# Patient Record
Sex: MALE | Race: BLACK OR AFRICAN AMERICAN | Employment: OTHER | URBAN - METROPOLITAN AREA
[De-identification: names, ages, dates, MRNs, and addresses within clinical notes are randomized per-mention and may not be internally consistent; named-entity substitution may affect disease eponyms.]

---

## 2017-03-09 ENCOUNTER — OFFICE VISIT (OUTPATIENT)
Dept: ENDOCRINOLOGY CLINIC | Facility: CLINIC | Age: 67
End: 2017-03-09

## 2017-03-09 VITALS
HEIGHT: 69.5 IN | HEART RATE: 67 BPM | RESPIRATION RATE: 14 BRPM | DIASTOLIC BLOOD PRESSURE: 89 MMHG | SYSTOLIC BLOOD PRESSURE: 174 MMHG | TEMPERATURE: 98 F | WEIGHT: 225 LBS | BODY MASS INDEX: 32.58 KG/M2

## 2017-03-09 DIAGNOSIS — Z79.4 TYPE 2 DIABETES MELLITUS WITHOUT COMPLICATION, WITH LONG-TERM CURRENT USE OF INSULIN (HCC): Primary | ICD-10-CM

## 2017-03-09 DIAGNOSIS — E11.9 TYPE 2 DIABETES MELLITUS WITHOUT COMPLICATION, WITH LONG-TERM CURRENT USE OF INSULIN (HCC): Primary | ICD-10-CM

## 2017-03-09 LAB
CARTRIDGE LOT#: 673 NUMERIC
GLUCOSE BLOOD: 104
HEMOGLOBIN A1C: 6.9 % (ref 4.3–5.6)
TEST STRIP LOT #: NORMAL NUMERIC

## 2017-03-09 PROCEDURE — 36416 COLLJ CAPILLARY BLOOD SPEC: CPT | Performed by: INTERNAL MEDICINE

## 2017-03-09 PROCEDURE — 83036 HEMOGLOBIN GLYCOSYLATED A1C: CPT | Performed by: INTERNAL MEDICINE

## 2017-03-09 PROCEDURE — 82962 GLUCOSE BLOOD TEST: CPT | Performed by: INTERNAL MEDICINE

## 2017-03-09 PROCEDURE — 99213 OFFICE O/P EST LOW 20 MIN: CPT | Performed by: INTERNAL MEDICINE

## 2017-03-09 NOTE — PROGRESS NOTES
Name: Desirae Braden  Date: 3/9/2017    Referring Physician: No ref. provider found    HISTORY OF PRESENT ILLNESS   Desirae Braden is a 73year old male who presents for diabetes mellitus, diagnosed with diabetes s/p kidney transplant in 2004.   He is current Pen-injector, Sliding scale three times per day , Disp: , Rfl:   •  nateglinide (STARLIX) 60 MG Oral Tab, TAKE 1 TABLET BY MOUTH DAILY WITH BREAKFAST AND LUNCH AND 2 TABLETS WITH DINNER, Disp: 120 tablet, Rfl: 6  •  Pravastatin Sodium (PRAVACHOL) 20 MG Ora ValACYclovir HCl (VALTREX) 500 MG Oral Tab, Take 500 mg by mouth daily. , Disp: , Rfl:       Allergies:      Ace Inhibitors              Comment:Lip swelling  Hydralazine                 Social History:   Social History    Marital Status: Single (102.059 kg)  BMI 32.76 kg/m2    General Appearance:  alert, well developed, in no acute distress  Eyes:  normal conjunctivae, sclera. , normal sclera and normal pupils  Ears/Nose/Mouth/Throat/Neck:  no palpable thyroid nodules or cervical lymphadenopathy

## 2017-03-17 ENCOUNTER — LAB ENCOUNTER (OUTPATIENT)
Dept: LAB | Facility: HOSPITAL | Age: 67
End: 2017-03-17
Attending: INTERNAL MEDICINE
Payer: MEDICARE

## 2017-03-17 DIAGNOSIS — E11.9 TYPE 2 DIABETES MELLITUS WITHOUT COMPLICATION, WITH LONG-TERM CURRENT USE OF INSULIN (HCC): ICD-10-CM

## 2017-03-17 DIAGNOSIS — Z94.0 KIDNEY REPLACED BY TRANSPLANT: Primary | ICD-10-CM

## 2017-03-17 DIAGNOSIS — Z79.4 TYPE 2 DIABETES MELLITUS WITHOUT COMPLICATION, WITH LONG-TERM CURRENT USE OF INSULIN (HCC): ICD-10-CM

## 2017-03-17 DIAGNOSIS — N18.30 CHRONIC KIDNEY DISEASE, STAGE III (MODERATE) (HCC): ICD-10-CM

## 2017-03-17 DIAGNOSIS — R80.9 PROTEINURIA: ICD-10-CM

## 2017-03-17 LAB
ALBUMIN SERPL BCP-MCNC: 3.4 G/DL (ref 3.5–4.8)
ALBUMIN SERPL BCP-MCNC: 3.4 G/DL (ref 3.5–4.8)
ALBUMIN/GLOB SERPL: 0.9 {RATIO} (ref 1–2)
ALP SERPL-CCNC: 50 U/L (ref 32–100)
ALT SERPL-CCNC: 20 U/L (ref 17–63)
ANION GAP SERPL CALC-SCNC: 5 MMOL/L (ref 0–18)
ANION GAP SERPL CALC-SCNC: 5 MMOL/L (ref 0–18)
AST SERPL-CCNC: 23 U/L (ref 15–41)
BILIRUB SERPL-MCNC: 0.8 MG/DL (ref 0.3–1.2)
BUN SERPL-MCNC: 37 MG/DL (ref 8–20)
BUN SERPL-MCNC: 37 MG/DL (ref 8–20)
BUN/CREAT SERPL: 17.5 (ref 10–20)
BUN/CREAT SERPL: 17.5 (ref 10–20)
CALCIUM SERPL-MCNC: 9 MG/DL (ref 8.5–10.5)
CALCIUM SERPL-MCNC: 9 MG/DL (ref 8.5–10.5)
CHLORIDE SERPL-SCNC: 104 MMOL/L (ref 95–110)
CHLORIDE SERPL-SCNC: 104 MMOL/L (ref 95–110)
CHOLEST SERPL-MCNC: 210 MG/DL (ref 110–200)
CO2 SERPL-SCNC: 33 MMOL/L (ref 22–32)
CO2 SERPL-SCNC: 33 MMOL/L (ref 22–32)
CREAT SERPL-MCNC: 2.12 MG/DL (ref 0.5–1.5)
CREAT SERPL-MCNC: 2.12 MG/DL (ref 0.5–1.5)
CREAT UR-MCNC: 122.7 MG/DL
GLOBULIN PLAS-MCNC: 3.7 G/DL (ref 2.5–3.7)
GLUCOSE SERPL-MCNC: 143 MG/DL (ref 70–99)
GLUCOSE SERPL-MCNC: 143 MG/DL (ref 70–99)
HDLC SERPL-MCNC: 57 MG/DL
LDLC SERPL CALC-MCNC: 135 MG/DL (ref 0–99)
MICROALBUMIN UR-MCNC: 191.7 MG/DL (ref 0–1.8)
MICROALBUMIN/CREAT UR: 1562.3 MG/G{CREAT} (ref 0–20)
NONHDLC SERPL-MCNC: 153 MG/DL
OSMOLALITY UR CALC.SUM OF ELEC: 305 MOSM/KG (ref 275–295)
OSMOLALITY UR CALC.SUM OF ELEC: 305 MOSM/KG (ref 275–295)
PHOSPHATE SERPL-MCNC: 3.4 MG/DL (ref 2.4–4.7)
POTASSIUM SERPL-SCNC: 3.7 MMOL/L (ref 3.3–5.1)
POTASSIUM SERPL-SCNC: 3.7 MMOL/L (ref 3.3–5.1)
PROT SERPL-MCNC: 7.1 G/DL (ref 5.9–8.4)
SODIUM SERPL-SCNC: 142 MMOL/L (ref 136–144)
SODIUM SERPL-SCNC: 142 MMOL/L (ref 136–144)
TRIGL SERPL-MCNC: 92 MG/DL (ref 1–149)
TSH SERPL-ACNC: 1.64 UIU/ML (ref 0.34–5.6)

## 2017-03-17 PROCEDURE — 82570 ASSAY OF URINE CREATININE: CPT

## 2017-03-17 PROCEDURE — 80053 COMPREHEN METABOLIC PANEL: CPT

## 2017-03-17 PROCEDURE — 82570 ASSAY OF URINE CREATININE: CPT | Performed by: INTERNAL MEDICINE

## 2017-03-17 PROCEDURE — 84156 ASSAY OF PROTEIN URINE: CPT | Performed by: INTERNAL MEDICINE

## 2017-03-17 PROCEDURE — 82043 UR ALBUMIN QUANTITATIVE: CPT

## 2017-03-17 PROCEDURE — 80158 DRUG ASSAY CYCLOSPORINE: CPT | Performed by: INTERNAL MEDICINE

## 2017-03-17 PROCEDURE — 80061 LIPID PANEL: CPT

## 2017-03-17 PROCEDURE — 36415 COLL VENOUS BLD VENIPUNCTURE: CPT

## 2017-03-17 PROCEDURE — 82043 UR ALBUMIN QUANTITATIVE: CPT | Performed by: INTERNAL MEDICINE

## 2017-03-17 PROCEDURE — 84443 ASSAY THYROID STIM HORMONE: CPT

## 2017-03-17 PROCEDURE — 80061 LIPID PANEL: CPT | Performed by: INTERNAL MEDICINE

## 2017-03-17 PROCEDURE — 84100 ASSAY OF PHOSPHORUS: CPT

## 2017-03-17 PROCEDURE — 83970 ASSAY OF PARATHORMONE: CPT | Performed by: INTERNAL MEDICINE

## 2017-03-20 ENCOUNTER — TELEPHONE (OUTPATIENT)
Dept: ENDOCRINOLOGY CLINIC | Facility: CLINIC | Age: 67
End: 2017-03-20

## 2017-03-20 ENCOUNTER — OFFICE VISIT (OUTPATIENT)
Dept: NEPHROLOGY | Facility: CLINIC | Age: 67
End: 2017-03-20

## 2017-03-20 VITALS
DIASTOLIC BLOOD PRESSURE: 95 MMHG | TEMPERATURE: 98 F | HEIGHT: 69.5 IN | WEIGHT: 230 LBS | BODY MASS INDEX: 33.3 KG/M2 | HEART RATE: 62 BPM | SYSTOLIC BLOOD PRESSURE: 169 MMHG

## 2017-03-20 DIAGNOSIS — R80.8 OTHER PROTEINURIA: ICD-10-CM

## 2017-03-20 DIAGNOSIS — Z94.0 DECEASED-DONOR KIDNEY TRANSPLANT: Primary | ICD-10-CM

## 2017-03-20 DIAGNOSIS — N18.30 CKD (CHRONIC KIDNEY DISEASE), STAGE III (HCC): ICD-10-CM

## 2017-03-20 DIAGNOSIS — E78.00 HYPERCHOLESTEREMIA: Primary | ICD-10-CM

## 2017-03-20 DIAGNOSIS — N05.1 FSGS (FOCAL SEGMENTAL GLOMERULOSCLEROSIS): ICD-10-CM

## 2017-03-20 PROCEDURE — G0463 HOSPITAL OUTPT CLINIC VISIT: HCPCS | Performed by: INTERNAL MEDICINE

## 2017-03-20 PROCEDURE — 99214 OFFICE O/P EST MOD 30 MIN: CPT | Performed by: INTERNAL MEDICINE

## 2017-03-20 RX ORDER — PRAVASTATIN SODIUM 40 MG
40 TABLET ORAL NIGHTLY
Qty: 30 TABLET | Refills: 3 | Status: SHIPPED | OUTPATIENT
Start: 2017-03-20 | End: 2017-07-26 | Stop reason: DRUGHIGH

## 2017-03-20 NOTE — TELEPHONE ENCOUNTER
Please call patient - labs are stable except significant elevation of cholesterol levels. Recommend increase pravastatin to 40mg PO daily and repeat lipids before next visit.

## 2017-03-20 NOTE — TELEPHONE ENCOUNTER
Called Noman and let him know per [facility] labs are stable except a significant elevation of cholesterol levels. Understands to increase Pravastatin to 40mg PO nightly and repeat levels before next visit.  New prescription sent to pharmacy and lab orders placed

## 2017-03-20 NOTE — PROGRESS NOTES
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 Disp:  Rfl:    Flaxseed, Linseed, (FLAXSEED OIL) 1000 MG Oral Cap Take 1,000 mg by mouth 3 (three) times daily.  Disp:  Rfl:    Fluticasone Propionate (FLONASE) 50 MCG/ACT Nasal Suspension 1 spray by Each Nare route mildred BP: 169/95   Pulse: 62   Temp: 98.1 °F (36.7 °C)       PHYSICAL EXAM:     Constitutional: appears well hydrated alert and responsive   Head/Face: normocephalic  Eyes/Vision: normal extraocular motion is intact  Nose/Mouth/Throat:mucous membranes are mois readings     4. HL:  - on pravastatin 40mg and flax seed oil    5. Secondary hyperparathyroidism:   - on vitamin D and sensipar   - serum calcium acceptable  - PTH and vitamin D is 110s and 43 respectively    6.  SCC of left third finger:   - stable - outpt

## 2017-03-20 NOTE — PATIENT INSTRUCTIONS
Follow up with nephrologist at [facility] work accordingly   Follow up at [facility] transplant nephrologist before leaving [facility]

## 2017-03-27 RX ORDER — NATEGLINIDE 60 MG/1
TABLET, COATED ORAL
Qty: 120 TABLET | Refills: 5 | Status: SHIPPED | OUTPATIENT
Start: 2017-03-27 | End: 2017-09-07

## 2017-07-11 ENCOUNTER — TELEPHONE (OUTPATIENT)
Dept: NEPHROLOGY | Facility: CLINIC | Age: 67
End: 2017-07-11

## 2017-07-11 DIAGNOSIS — Z94.0 DECEASED-DONOR KIDNEY TRANSPLANT: Primary | ICD-10-CM

## 2017-07-26 ENCOUNTER — TELEPHONE (OUTPATIENT)
Dept: ENDOCRINOLOGY CLINIC | Facility: CLINIC | Age: 67
End: 2017-07-26

## 2017-07-26 ENCOUNTER — APPOINTMENT (OUTPATIENT)
Dept: LAB | Facility: HOSPITAL | Age: 67
End: 2017-07-26
Attending: INTERNAL MEDICINE
Payer: MEDICARE

## 2017-07-26 DIAGNOSIS — Z94.0 DECEASED-DONOR KIDNEY TRANSPLANT: ICD-10-CM

## 2017-07-26 DIAGNOSIS — E78.00 HYPERCHOLESTEREMIA: ICD-10-CM

## 2017-07-26 LAB
ALBUMIN SERPL BCP-MCNC: 3.2 G/DL (ref 3.5–4.8)
ANION GAP SERPL CALC-SCNC: 4 MMOL/L (ref 0–18)
BUN SERPL-MCNC: 49 MG/DL (ref 8–20)
BUN/CREAT SERPL: 21.3 (ref 10–20)
CALCIUM SERPL-MCNC: 9 MG/DL (ref 8.5–10.5)
CHLORIDE SERPL-SCNC: 106 MMOL/L (ref 95–110)
CHOLEST SERPL-MCNC: 201 MG/DL (ref 110–200)
CO2 SERPL-SCNC: 29 MMOL/L (ref 22–32)
CREAT SERPL-MCNC: 2.3 MG/DL (ref 0.5–1.5)
CREAT UR-MCNC: 22.6 MG/DL
GLUCOSE SERPL-MCNC: 81 MG/DL (ref 70–99)
HDLC SERPL-MCNC: 45 MG/DL
LDLC SERPL CALC-MCNC: 138 MG/DL (ref 0–99)
MICROALBUMIN UR-MCNC: 27.2 MG/DL (ref 0–1.8)
MICROALBUMIN/CREAT UR: 1203.5 MG/G{CREAT} (ref 0–20)
NONHDLC SERPL-MCNC: 156 MG/DL
OSMOLALITY UR CALC.SUM OF ELEC: 300 MOSM/KG (ref 275–295)
PHOSPHATE SERPL-MCNC: 4 MG/DL (ref 2.4–4.7)
POTASSIUM SERPL-SCNC: 3.9 MMOL/L (ref 3.3–5.1)
SODIUM SERPL-SCNC: 139 MMOL/L (ref 136–144)
TRIGL SERPL-MCNC: 88 MG/DL (ref 1–149)

## 2017-07-26 PROCEDURE — 82043 UR ALBUMIN QUANTITATIVE: CPT

## 2017-07-26 PROCEDURE — 80158 DRUG ASSAY CYCLOSPORINE: CPT

## 2017-07-26 PROCEDURE — 80061 LIPID PANEL: CPT

## 2017-07-26 PROCEDURE — 80069 RENAL FUNCTION PANEL: CPT

## 2017-07-26 PROCEDURE — 82570 ASSAY OF URINE CREATININE: CPT

## 2017-07-26 PROCEDURE — 36415 COLL VENOUS BLD VENIPUNCTURE: CPT

## 2017-07-26 RX ORDER — ATORVASTATIN CALCIUM 40 MG/1
40 TABLET, FILM COATED ORAL NIGHTLY
Qty: 30 TABLET | Refills: 6 | Status: SHIPPED | OUTPATIENT
Start: 2017-07-26 | End: 2017-07-27 | Stop reason: ALTCHOICE

## 2017-07-26 NOTE — TELEPHONE ENCOUNTER
Please call patient - cholesterol level is significantly elevated, has he ever taken medication for elevated level? Recommend starting atorvastatin 40mg PO QHS, #30, refill 6.

## 2017-07-26 NOTE — TELEPHONE ENCOUNTER
Called Noman and informed him of elevated cholesterol level. He confirmed he is currently taking pravastatin 40mg PO nightly. Still change to atorvastatin or change dose of current medication?

## 2017-07-26 NOTE — TELEPHONE ENCOUNTER
Spoke with Rebecca Killian again and let him know per [facility] will keep dose the same but will change to Atorvastatin 40mg PO nightly. Sent Rx as written below.

## 2017-07-27 ENCOUNTER — OFFICE VISIT (OUTPATIENT)
Dept: NEPHROLOGY | Facility: CLINIC | Age: 67
End: 2017-07-27

## 2017-07-27 VITALS
HEART RATE: 68 BPM | TEMPERATURE: 98 F | SYSTOLIC BLOOD PRESSURE: 159 MMHG | BODY MASS INDEX: 32.45 KG/M2 | WEIGHT: 226.63 LBS | HEIGHT: 70 IN | DIASTOLIC BLOOD PRESSURE: 79 MMHG

## 2017-07-27 DIAGNOSIS — R80.1 PERSISTENT PROTEINURIA: ICD-10-CM

## 2017-07-27 DIAGNOSIS — Z94.0 DECEASED-DONOR KIDNEY TRANSPLANT RECIPIENT: ICD-10-CM

## 2017-07-27 DIAGNOSIS — N18.30 CKD (CHRONIC KIDNEY DISEASE), STAGE III (HCC): Primary | ICD-10-CM

## 2017-07-27 LAB — CYCLOSPORINE A: 58.5 NG/ML

## 2017-07-27 PROCEDURE — G0463 HOSPITAL OUTPT CLINIC VISIT: HCPCS | Performed by: INTERNAL MEDICINE

## 2017-07-27 PROCEDURE — 99214 OFFICE O/P EST MOD 30 MIN: CPT | Performed by: INTERNAL MEDICINE

## 2017-07-27 RX ORDER — PRAVASTATIN SODIUM 40 MG
40 TABLET ORAL NIGHTLY
Refills: 3 | COMMUNITY
Start: 2017-07-22 | End: 2017-09-07 | Stop reason: ALTCHOICE

## 2017-07-27 NOTE — PROGRESS NOTES
Progress Note     Mariella Bhat is a 60 yrs old male with pmh of DDKT on 12/04 at 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 Disp:  Rfl:    furosemide (LASIX) 40 MG Oral Tab Take 40 mg by mouth daily. Disp:  Rfl:    Irbesartan 300 MG Oral Tab Take 300 mg by mouth nightly.  Disp:  Rfl:    Lansoprazole 30 MG Oral Tablet D responsive   Head/Face: normocephalic  Eyes/Vision: normal extraocular motion is intact  Nose/Mouth/Throat:mucous membranes are moist  Neck/Thyroid: neck is supple without adenopathy  Lymphatic: no abnormal cervical, supraclavicular adenopathy is noted  Re oil    5. Secondary hyperparathyroidism:   - on vitamin D and sensipar   - serum calcium acceptable  - PTH and vitamin D is 110s and 43 respectively    6.  SCC of left third finger:   - stable - outpt    Follow up in 3 months     Orders This Visit:    Order

## 2017-07-27 NOTE — PATIENT INSTRUCTIONS
Blood test in one week   Follow up in 3 months   Follow up with transplant nephrologist at [facility]

## 2017-07-28 ENCOUNTER — TELEPHONE (OUTPATIENT)
Dept: NEPHROLOGY | Facility: CLINIC | Age: 67
End: 2017-07-28

## 2017-07-28 NOTE — TELEPHONE ENCOUNTER
Spoke to pt, relayed Dr. Jyotsna Esquivel message as shown below. Pt verbalized understanding of whole message with no further questions at this time.

## 2017-08-17 RX ORDER — PRAVASTATIN SODIUM 40 MG
TABLET ORAL
Qty: 90 TABLET | Refills: 0 | Status: SHIPPED | OUTPATIENT
Start: 2017-08-17 | End: 2017-09-07 | Stop reason: ALTCHOICE

## 2017-08-29 ENCOUNTER — TELEPHONE (OUTPATIENT)
Dept: NEPHROLOGY | Facility: CLINIC | Age: 67
End: 2017-08-29

## 2017-08-29 NOTE — TELEPHONE ENCOUNTER
Pt has outstanding lab order for renal panel that was ordered by [facility] and to be done in 1 week from his visit on 7/27/17. Attempted to contact pt and a man answered the phone stating pt had just stepped out and to call back in 10 minutes.

## 2017-09-07 ENCOUNTER — OFFICE VISIT (OUTPATIENT)
Dept: ENDOCRINOLOGY CLINIC | Facility: CLINIC | Age: 67
End: 2017-09-07

## 2017-09-07 VITALS
DIASTOLIC BLOOD PRESSURE: 78 MMHG | BODY MASS INDEX: 34.24 KG/M2 | HEART RATE: 65 BPM | WEIGHT: 231.19 LBS | SYSTOLIC BLOOD PRESSURE: 134 MMHG | HEIGHT: 69 IN

## 2017-09-07 DIAGNOSIS — E11.65 CONTROLLED TYPE 2 DIABETES MELLITUS WITH HYPERGLYCEMIA, WITH LONG-TERM CURRENT USE OF INSULIN (HCC): Primary | ICD-10-CM

## 2017-09-07 DIAGNOSIS — Z79.4 CONTROLLED TYPE 2 DIABETES MELLITUS WITH HYPERGLYCEMIA, WITH LONG-TERM CURRENT USE OF INSULIN (HCC): Primary | ICD-10-CM

## 2017-09-07 LAB
CARTRIDGE LOT#: ABNORMAL NUMERIC
GLUCOSE BLOOD: 257
HEMOGLOBIN A1C: 7.3 % (ref 4.3–5.6)
TEST STRIP LOT #: NORMAL NUMERIC

## 2017-09-07 PROCEDURE — 83036 HEMOGLOBIN GLYCOSYLATED A1C: CPT | Performed by: INTERNAL MEDICINE

## 2017-09-07 PROCEDURE — 82962 GLUCOSE BLOOD TEST: CPT | Performed by: INTERNAL MEDICINE

## 2017-09-07 PROCEDURE — 99213 OFFICE O/P EST LOW 20 MIN: CPT | Performed by: INTERNAL MEDICINE

## 2017-09-07 PROCEDURE — 36416 COLLJ CAPILLARY BLOOD SPEC: CPT | Performed by: INTERNAL MEDICINE

## 2017-09-07 RX ORDER — NATEGLINIDE 60 MG/1
TABLET, COATED ORAL
Qty: 120 TABLET | Refills: 5 | Status: SHIPPED | OUTPATIENT
Start: 2017-09-07 | End: 2018-02-26

## 2017-09-07 RX ORDER — ATORVASTATIN CALCIUM 40 MG/1
40 TABLET, FILM COATED ORAL NIGHTLY
Qty: 90 TABLET | Refills: 1 | Status: SHIPPED | OUTPATIENT
Start: 2017-09-07 | End: 2019-09-24

## 2017-09-07 RX ORDER — SPIRONOLACTONE 25 MG/1
TABLET ORAL
Refills: 6 | COMMUNITY
Start: 2017-08-06 | End: 2019-08-27 | Stop reason: SINTOL

## 2017-09-07 NOTE — PROGRESS NOTES
Name: Dakota Young  Date: 9/7/2017    Referring Physician: No ref. provider found    HISTORY OF PRESENT ILLNESS   Dakota Young is a 78year old male who presents for diabetes mellitus, diagnosed with diabetes s/p kidney transplant in 2004.   He is current predniSONE 10 MG Oral Tab, Take 10 mg by mouth daily. , Disp: , Rfl:   •  AmLODIPine Besylate 10 MG Oral Tab, Take 10 mg by mouth daily. , Disp: , Rfl:   •  HUMALOG KWIKPEN 100 UNIT/ML Subcutaneous Solution Pen-injector, Sliding scale three times per day , D by mouth daily. , Disp: , Rfl:   •  spironolactone 25 MG Oral Tab, TK 1 T PO D, Disp: , Rfl: 6      Allergies:      Ace Inhibitors              Comment:Lip swelling  Hydralazine                 Social History:   Social History    Marital status: Single PHYSICAL EXAM  /78 (BP Location: Left arm, Patient Position: Sitting, Cuff Size: large)   Pulse 65   Ht 5' 9\" (1.753 m)   Wt 231 lb 3.2 oz (104.9 kg)   BMI 34.14 kg/m²     General Appearance:  alert, well developed, in no acute distress

## 2017-09-25 RX ORDER — NATEGLINIDE 60 MG/1
TABLET, COATED ORAL
Qty: 120 TABLET | Refills: 0 | Status: SHIPPED | OUTPATIENT
Start: 2017-09-25 | End: 2017-10-27

## 2017-10-25 ENCOUNTER — APPOINTMENT (OUTPATIENT)
Dept: LAB | Facility: HOSPITAL | Age: 67
End: 2017-10-25
Attending: INTERNAL MEDICINE
Payer: MEDICARE

## 2017-10-25 DIAGNOSIS — R80.1 PERSISTENT PROTEINURIA: ICD-10-CM

## 2017-10-25 DIAGNOSIS — Z94.0 DECEASED-DONOR KIDNEY TRANSPLANT RECIPIENT: ICD-10-CM

## 2017-10-25 DIAGNOSIS — N18.30 CKD (CHRONIC KIDNEY DISEASE), STAGE III (HCC): ICD-10-CM

## 2017-10-25 PROCEDURE — 80069 RENAL FUNCTION PANEL: CPT

## 2017-10-25 PROCEDURE — 36415 COLL VENOUS BLD VENIPUNCTURE: CPT

## 2017-10-27 ENCOUNTER — OFFICE VISIT (OUTPATIENT)
Dept: NEPHROLOGY | Facility: CLINIC | Age: 67
End: 2017-10-27

## 2017-10-27 VITALS
SYSTOLIC BLOOD PRESSURE: 147 MMHG | TEMPERATURE: 98 F | HEIGHT: 69.5 IN | HEART RATE: 66 BPM | WEIGHT: 231.63 LBS | DIASTOLIC BLOOD PRESSURE: 71 MMHG | BODY MASS INDEX: 33.54 KG/M2

## 2017-10-27 DIAGNOSIS — N18.30 CKD (CHRONIC KIDNEY DISEASE), STAGE III (HCC): ICD-10-CM

## 2017-10-27 DIAGNOSIS — R80.1 PERSISTENT PROTEINURIA: ICD-10-CM

## 2017-10-27 DIAGNOSIS — Z94.0 DECEASED-DONOR KIDNEY TRANSPLANT RECIPIENT: Primary | ICD-10-CM

## 2017-10-27 DIAGNOSIS — N05.1 FSGS (FOCAL SEGMENTAL GLOMERULOSCLEROSIS): ICD-10-CM

## 2017-10-27 PROCEDURE — 99214 OFFICE O/P EST MOD 30 MIN: CPT | Performed by: INTERNAL MEDICINE

## 2017-10-27 PROCEDURE — G0463 HOSPITAL OUTPT CLINIC VISIT: HCPCS | Performed by: INTERNAL MEDICINE

## 2017-10-27 RX ORDER — ERGOCALCIFEROL (VITAMIN D2) 1250 MCG
50000 CAPSULE ORAL WEEKLY
COMMUNITY

## 2017-10-27 NOTE — PROGRESS NOTES
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 Disp:  Rfl:    allopurinol (ZYLOPRIM) 100 MG Oral Tab Take 100 mg by mouth daily. Disp:  Rfl:    Insulin Pen Needle (BD PEN NEEDLE CICI U/F) 32G X 4 MM Does not apply Misc by Does not apply route.  Disp:  Rfl:    Insulin Syr abdominal pain, constipation, decreased appetite  Genitourinary:  Negative for dysuria and hematuria  Endocrine:  Negative for abnormal sleep patterns, increased activity  Hema/Lymph:  Negative for easy bleeding and easy bruising  Integumentary:  Negative - cont. current dose.  Aim for level btw 50-75 given h/o SCC  - on cellcept 250mg BID   - tapering dose of steroid secondary to FSGS in transplant kidneys - prednisone since 9/14 and at 10mg since 2/15, dose was reduced to 5 mg but now at 10 mg for last 2-

## 2018-02-26 RX ORDER — NATEGLINIDE 60 MG/1
TABLET, COATED ORAL
Qty: 120 TABLET | Refills: 0 | Status: SHIPPED | OUTPATIENT
Start: 2018-02-26 | End: 2018-02-27

## 2018-02-28 RX ORDER — NATEGLINIDE 60 MG/1
TABLET, COATED ORAL
Qty: 120 TABLET | Refills: 0 | Status: SHIPPED | OUTPATIENT
Start: 2018-02-28 | End: 2018-03-08

## 2018-03-08 ENCOUNTER — OFFICE VISIT (OUTPATIENT)
Dept: ENDOCRINOLOGY CLINIC | Facility: CLINIC | Age: 68
End: 2018-03-08

## 2018-03-08 VITALS
HEIGHT: 69.5 IN | SYSTOLIC BLOOD PRESSURE: 140 MMHG | BODY MASS INDEX: 32.89 KG/M2 | WEIGHT: 227.19 LBS | HEART RATE: 62 BPM | DIASTOLIC BLOOD PRESSURE: 56 MMHG

## 2018-03-08 DIAGNOSIS — Z79.4 CONTROLLED TYPE 2 DIABETES MELLITUS WITHOUT COMPLICATION, WITH LONG-TERM CURRENT USE OF INSULIN (HCC): Primary | ICD-10-CM

## 2018-03-08 DIAGNOSIS — E11.9 CONTROLLED TYPE 2 DIABETES MELLITUS WITHOUT COMPLICATION, WITH LONG-TERM CURRENT USE OF INSULIN (HCC): Primary | ICD-10-CM

## 2018-03-08 LAB
CARTRIDGE LOT#: ABNORMAL NUMERIC
GLUCOSE BLOOD: 69
HEMOGLOBIN A1C: 6.5 % (ref 4.3–5.6)
TEST STRIP LOT #: NORMAL NUMERIC

## 2018-03-08 PROCEDURE — 83036 HEMOGLOBIN GLYCOSYLATED A1C: CPT | Performed by: INTERNAL MEDICINE

## 2018-03-08 PROCEDURE — 82962 GLUCOSE BLOOD TEST: CPT | Performed by: INTERNAL MEDICINE

## 2018-03-08 PROCEDURE — 36416 COLLJ CAPILLARY BLOOD SPEC: CPT | Performed by: INTERNAL MEDICINE

## 2018-03-08 PROCEDURE — 99213 OFFICE O/P EST LOW 20 MIN: CPT | Performed by: INTERNAL MEDICINE

## 2018-03-08 RX ORDER — OMEPRAZOLE 40 MG/1
CAPSULE, DELAYED RELEASE ORAL
COMMUNITY
Start: 2018-02-28

## 2018-03-08 RX ORDER — NATEGLINIDE 60 MG/1
TABLET, COATED ORAL
Qty: 120 TABLET | Refills: 6 | Status: SHIPPED | OUTPATIENT
Start: 2018-03-08 | End: 2018-09-23

## 2018-03-08 RX ORDER — PRAVASTATIN SODIUM 40 MG
20 TABLET ORAL
COMMUNITY
Start: 2018-02-28 | End: 2019-09-24

## 2018-03-08 NOTE — PROGRESS NOTES
Name: Lee Toribio  Date: 3/8/2018    Referring Physician: No ref. provider found    HISTORY OF PRESENT ILLNESS   Lee Toribio is a 78year old male who presents for diabetes mellitus, diagnosed with diabetes s/p kidney transplant in 2004.   He is current (two) times daily. , Disp: , Rfl:   •  predniSONE 10 MG Oral Tab, Take 10 mg by mouth daily. , Disp: , Rfl:   •  AmLODIPine Besylate 10 MG Oral Tab, Take 10 mg by mouth daily. , Disp: , Rfl:   •  HUMALOG KWIKPEN 100 UNIT/ML Subcutaneous Solution Pen-injector, atorvastatin 40 MG Oral Tab, Take 1 tablet (40 mg total) by mouth nightly., Disp: 90 tablet, Rfl: 1  •  Insulin Syringe-Needle U-100 (BD INSULIN SYRINGE) 31G X 5/16\" 0.3 ML Does not apply Misc, by Does not apply route., Disp: , Rfl:       Allergies: 40. 00        Types: Cigarettes  Smokeless tobacco: Never Used                      Comment: Quit in 1999 smoked pack a week  Alcohol use:  No                   PHYSICAL EXAM  BP (!) 165/88   Pulse 62   Ht 5' 9.5\" (1.765 m)   Wt 227 lb 3.2 oz (103.1 kg)   B

## 2018-03-08 NOTE — PROGRESS NOTES
Patient presented to the clinic with low blood sugar of 69. He was given 4 oz of apple juice. BG increase to 83. He was sent home with an additional 4 oz of apple juice.

## 2018-09-06 ENCOUNTER — OFFICE VISIT (OUTPATIENT)
Dept: ENDOCRINOLOGY CLINIC | Facility: CLINIC | Age: 68
End: 2018-09-06
Payer: MEDICARE

## 2018-09-06 VITALS
HEART RATE: 61 BPM | WEIGHT: 226 LBS | BODY MASS INDEX: 33 KG/M2 | DIASTOLIC BLOOD PRESSURE: 81 MMHG | SYSTOLIC BLOOD PRESSURE: 154 MMHG

## 2018-09-06 DIAGNOSIS — E11.9 DIABETES MELLITUS TYPE 2 IN NONOBESE (HCC): Primary | ICD-10-CM

## 2018-09-06 LAB
CARTRIDGE LOT#: ABNORMAL NUMERIC
GLUCOSE BLOOD: 70
HEMOGLOBIN A1C: 7.1 % (ref 4.3–5.6)
TEST STRIP LOT #: NORMAL NUMERIC

## 2018-09-06 PROCEDURE — 36416 COLLJ CAPILLARY BLOOD SPEC: CPT | Performed by: INTERNAL MEDICINE

## 2018-09-06 PROCEDURE — 83036 HEMOGLOBIN GLYCOSYLATED A1C: CPT | Performed by: INTERNAL MEDICINE

## 2018-09-06 PROCEDURE — 99213 OFFICE O/P EST LOW 20 MIN: CPT | Performed by: INTERNAL MEDICINE

## 2018-09-06 PROCEDURE — 82962 GLUCOSE BLOOD TEST: CPT | Performed by: INTERNAL MEDICINE

## 2018-09-06 RX ORDER — ERGOCALCIFEROL 1.25 MG/1
50000 CAPSULE ORAL
COMMUNITY
End: 2018-09-06

## 2018-09-06 NOTE — PROGRESS NOTES
Name: Violette Shea  Date: 9/6/2018    Referring Physician: No ref. provider found    HISTORY OF PRESENT ILLNESS   Violette Shea is a 80year old male who presents for diabetes mellitus, diagnosed with diabetes s/p kidney transplant in 2004.   He is current mouth nightly., Disp: 90 tablet, Rfl: 1  •  CloNIDine HCl 0.3 MG/24HR Transdermal Patch Weekly, Place 1 patch onto the skin once a week., Disp: , Rfl:   •  cycloSPORINE non-modified 25 MG Oral Cap, Take 100 mg by mouth 2 (two) times daily. , Disp: , Rfl: (BACTRIM DS,SEPTRA DS) 800-160 MG Oral Tab, Take 1 tablet by mouth daily. Monday's and thursdays , Disp: , Rfl:   •  tamsulosin HCl (FLOMAX) 0.4 MG Oral Cap, Take 0.4 mg by mouth daily. , Disp: , Rfl:   •  ValACYclovir HCl (VALTREX) 500 MG Oral Tab, Take 50 Packs/day: 0.00      Years: 40.00        Types: Cigarettes  Smokeless tobacco: Never Used                      Comment: Quit in 1999 smoked pack a week  Alcohol use:  No                   PHYSICAL EXAM  /81 (BP Location: Left arm, Cuff Size: larg

## 2018-09-24 RX ORDER — NATEGLINIDE 60 MG/1
TABLET, COATED ORAL
Qty: 360 TABLET | Refills: 3 | Status: SHIPPED | OUTPATIENT
Start: 2018-09-24 | End: 2019-11-13

## 2019-02-19 ENCOUNTER — TELEPHONE (OUTPATIENT)
Dept: ENDOCRINOLOGY CLINIC | Facility: CLINIC | Age: 69
End: 2019-02-19

## 2019-02-19 NOTE — TELEPHONE ENCOUNTER
Pt states he is in between looking for a new PCP and is wondering if [facility] will agree to refill Amlodipine 10 mg. Pls call. Thank you. Current Outpatient Medications: AmLODIPine Besylate 10 MG Oral Tab Take 10 mg by mouth daily.  Disp:  Rfl:

## 2019-02-20 RX ORDER — AMLODIPINE BESYLATE 10 MG/1
10 TABLET ORAL DAILY
Qty: 30 TABLET | Refills: 0 | Status: SHIPPED | OUTPATIENT
Start: 2019-02-20 | End: 2019-02-20

## 2019-02-20 RX ORDER — AMLODIPINE BESYLATE 10 MG/1
TABLET ORAL
Qty: 90 TABLET | Refills: 0 | Status: SHIPPED | OUTPATIENT
Start: 2019-02-20

## 2019-03-07 ENCOUNTER — OFFICE VISIT (OUTPATIENT)
Dept: ENDOCRINOLOGY CLINIC | Facility: CLINIC | Age: 69
End: 2019-03-07
Payer: MEDICARE

## 2019-03-07 VITALS
SYSTOLIC BLOOD PRESSURE: 148 MMHG | HEART RATE: 64 BPM | DIASTOLIC BLOOD PRESSURE: 74 MMHG | WEIGHT: 225.81 LBS | BODY MASS INDEX: 33 KG/M2

## 2019-03-07 DIAGNOSIS — Z79.4 CONTROLLED TYPE 2 DIABETES MELLITUS WITH COMPLICATION, WITH LONG-TERM CURRENT USE OF INSULIN (HCC): Primary | ICD-10-CM

## 2019-03-07 DIAGNOSIS — E11.8 CONTROLLED TYPE 2 DIABETES MELLITUS WITH COMPLICATION, WITH LONG-TERM CURRENT USE OF INSULIN (HCC): Primary | ICD-10-CM

## 2019-03-07 LAB
CARTRIDGE LOT#: ABNORMAL NUMERIC
GLUCOSE BLOOD: 118
HEMOGLOBIN A1C: 6.5 % (ref 4.3–5.6)
TEST STRIP LOT #: NORMAL NUMERIC

## 2019-03-07 PROCEDURE — 83036 HEMOGLOBIN GLYCOSYLATED A1C: CPT | Performed by: INTERNAL MEDICINE

## 2019-03-07 PROCEDURE — 82962 GLUCOSE BLOOD TEST: CPT | Performed by: INTERNAL MEDICINE

## 2019-03-07 PROCEDURE — 99213 OFFICE O/P EST LOW 20 MIN: CPT | Performed by: INTERNAL MEDICINE

## 2019-03-07 PROCEDURE — G0463 HOSPITAL OUTPT CLINIC VISIT: HCPCS | Performed by: INTERNAL MEDICINE

## 2019-03-07 PROCEDURE — 36416 COLLJ CAPILLARY BLOOD SPEC: CPT | Performed by: INTERNAL MEDICINE

## 2019-03-07 NOTE — PROGRESS NOTES
Name: Jo Ann Chaidez  Date: 3/7/2019    Referring Physician: No ref. provider found    HISTORY OF PRESENT ILLNESS   Jo Ann Chaidez is a 80year old male who presents for diabetes mellitus, diagnosed with diabetes s/p kidney transplant in 2004.   He is current Release, , Disp: , Rfl:   •  ergocalciferol 11536 units Oral Cap, Take 50,000 Units by mouth once a week., Disp: , Rfl:   •  spironolactone 25 MG Oral Tab, TK 1 T PO D, Disp: , Rfl: 6  •  atorvastatin 40 MG Oral Tab, Take 1 tablet (40 mg total) by mouth ni morning.  , Disp: , Rfl:   •  Cinacalcet HCl (SENSIPAR) 30 MG Oral Tab, Take 30 mg by mouth daily with breakfast., Disp: , Rfl:   •  sulfamethoxazole-trimethoprim (BACTRIM DS,SEPTRA DS) 800-160 MG Oral Tab, Take 1 tablet by mouth daily.  Monday's and thursd TRANSPLANTATION OF KIDNEY  2004      No family history on file.    Social History    Tobacco Use      Smoking status: Former Smoker        Years: 40.00        Types: Cigarettes      Smokeless tobacco: Never Used      Tobacco comment: Quit in 1999 smoked pac transplant followed by Dr. Vargas Milton - referred back to [facility] are followed by [facility] transplant team     RTC 6 months    3/7/2019  Tea Whittaker MD

## 2019-08-20 ENCOUNTER — TELEPHONE (OUTPATIENT)
Dept: NEPHROLOGY | Facility: CLINIC | Age: 69
End: 2019-08-20

## 2019-08-20 DIAGNOSIS — N18.30 CKD (CHRONIC KIDNEY DISEASE), STAGE III (HCC): ICD-10-CM

## 2019-08-20 DIAGNOSIS — N05.1 FSGS (FOCAL SEGMENTAL GLOMERULOSCLEROSIS): ICD-10-CM

## 2019-08-20 DIAGNOSIS — Z94.0 DECEASED-DONOR KIDNEY TRANSPLANT RECIPIENT: Primary | ICD-10-CM

## 2019-08-20 NOTE — TELEPHONE ENCOUNTER
Dr. Jesus Peterson, please see message below and advise if labs are to be done prior to scheduled appt. Thank you.

## 2019-08-26 ENCOUNTER — LAB ENCOUNTER (OUTPATIENT)
Dept: LAB | Facility: HOSPITAL | Age: 69
End: 2019-08-26
Attending: INTERNAL MEDICINE
Payer: MEDICARE

## 2019-08-26 DIAGNOSIS — Z94.0 DECEASED-DONOR KIDNEY TRANSPLANT RECIPIENT: ICD-10-CM

## 2019-08-26 DIAGNOSIS — N05.1 FSGS (FOCAL SEGMENTAL GLOMERULOSCLEROSIS): ICD-10-CM

## 2019-08-26 DIAGNOSIS — N18.30 CKD (CHRONIC KIDNEY DISEASE), STAGE III (HCC): ICD-10-CM

## 2019-08-26 LAB
25(OH)D3 SERPL-MCNC: 40 NG/ML (ref 30–100)
ALBUMIN SERPL-MCNC: 3.4 G/DL (ref 3.4–5)
ANION GAP SERPL CALC-SCNC: 7 MMOL/L (ref 0–18)
BACTERIA UR QL AUTO: NEGATIVE /HPF
BASOPHILS # BLD AUTO: 0.04 X10(3) UL (ref 0–0.2)
BASOPHILS NFR BLD AUTO: 0.6 %
BILIRUB UR QL: NEGATIVE
BUN BLD-MCNC: 56 MG/DL (ref 7–18)
BUN/CREAT SERPL: 20.5 (ref 10–20)
CALCIUM BLD-MCNC: 8.8 MG/DL (ref 8.5–10.1)
CHLORIDE SERPL-SCNC: 106 MMOL/L (ref 98–112)
CLARITY UR: CLEAR
CO2 SERPL-SCNC: 31 MMOL/L (ref 21–32)
COLOR UR: YELLOW
CREAT BLD-MCNC: 2.73 MG/DL (ref 0.7–1.3)
CREAT UR-SCNC: 80.3 MG/DL
DEPRECATED RDW RBC AUTO: 50.4 FL (ref 35.1–46.3)
EOSINOPHIL # BLD AUTO: 0.1 X10(3) UL (ref 0–0.7)
EOSINOPHIL NFR BLD AUTO: 1.6 %
ERYTHROCYTE [DISTWIDTH] IN BLOOD BY AUTOMATED COUNT: 13.2 % (ref 11–15)
GLUCOSE BLD-MCNC: 135 MG/DL (ref 70–99)
GLUCOSE UR-MCNC: NEGATIVE MG/DL
HCT VFR BLD AUTO: 36.5 % (ref 39–53)
HGB BLD-MCNC: 11.6 G/DL (ref 13–17.5)
HGB UR QL STRIP.AUTO: NEGATIVE
IMM GRANULOCYTES # BLD AUTO: 0.06 X10(3) UL (ref 0–1)
IMM GRANULOCYTES NFR BLD: 0.9 %
KETONES UR-MCNC: NEGATIVE MG/DL
LEUKOCYTE ESTERASE UR QL STRIP.AUTO: NEGATIVE
LYMPHOCYTES # BLD AUTO: 1.39 X10(3) UL (ref 1–4)
LYMPHOCYTES NFR BLD AUTO: 21.6 %
MCH RBC QN AUTO: 32.6 PG (ref 26–34)
MCHC RBC AUTO-ENTMCNC: 31.8 G/DL (ref 31–37)
MCV RBC AUTO: 102.5 FL (ref 80–100)
MICROALBUMIN UR-MCNC: 152 MG/DL
MICROALBUMIN/CREAT 24H UR-RTO: 1892.9 UG/MG (ref ?–30)
MONOCYTES # BLD AUTO: 0.65 X10(3) UL (ref 0.1–1)
MONOCYTES NFR BLD AUTO: 10.1 %
NEUTROPHILS # BLD AUTO: 4.2 X10 (3) UL (ref 1.5–7.7)
NEUTROPHILS # BLD AUTO: 4.2 X10(3) UL (ref 1.5–7.7)
NEUTROPHILS NFR BLD AUTO: 65.2 %
NITRITE UR QL STRIP.AUTO: NEGATIVE
OSMOLALITY SERPL CALC.SUM OF ELEC: 316 MOSM/KG (ref 275–295)
PH UR: 6 [PH] (ref 5–8)
PHOSPHATE SERPL-MCNC: 4.3 MG/DL (ref 2.5–4.9)
PLATELET # BLD AUTO: 161 10(3)UL (ref 150–450)
POTASSIUM SERPL-SCNC: 4.4 MMOL/L (ref 3.5–5.1)
PROT UR-MCNC: 100 MG/DL
PTH-INTACT SERPL-MCNC: 137.7 PG/ML (ref 18.5–88)
RBC # BLD AUTO: 3.56 X10(6)UL (ref 3.8–5.8)
RBC #/AREA URNS AUTO: <1 /HPF
SODIUM SERPL-SCNC: 144 MMOL/L (ref 136–145)
SP GR UR STRIP: 1.01 (ref 1–1.03)
UROBILINOGEN UR STRIP-ACNC: <2
VIT C UR-MCNC: NEGATIVE MG/DL
WBC # BLD AUTO: 6.4 X10(3) UL (ref 4–11)
WBC #/AREA URNS AUTO: <1 /HPF

## 2019-08-26 PROCEDURE — 81001 URINALYSIS AUTO W/SCOPE: CPT

## 2019-08-26 PROCEDURE — 82570 ASSAY OF URINE CREATININE: CPT

## 2019-08-26 PROCEDURE — 36415 COLL VENOUS BLD VENIPUNCTURE: CPT

## 2019-08-26 PROCEDURE — 80197 ASSAY OF TACROLIMUS: CPT

## 2019-08-26 PROCEDURE — 80069 RENAL FUNCTION PANEL: CPT

## 2019-08-26 PROCEDURE — 82306 VITAMIN D 25 HYDROXY: CPT | Performed by: INTERNAL MEDICINE

## 2019-08-26 PROCEDURE — 85025 COMPLETE CBC W/AUTO DIFF WBC: CPT

## 2019-08-26 PROCEDURE — 82043 UR ALBUMIN QUANTITATIVE: CPT

## 2019-08-26 PROCEDURE — 83970 ASSAY OF PARATHORMONE: CPT

## 2019-08-27 ENCOUNTER — OFFICE VISIT (OUTPATIENT)
Dept: NEPHROLOGY | Facility: CLINIC | Age: 69
End: 2019-08-27
Payer: MEDICARE

## 2019-08-27 VITALS
WEIGHT: 216.63 LBS | BODY MASS INDEX: 32.08 KG/M2 | HEIGHT: 69 IN | TEMPERATURE: 97 F | SYSTOLIC BLOOD PRESSURE: 163 MMHG | HEART RATE: 66 BPM | DIASTOLIC BLOOD PRESSURE: 81 MMHG

## 2019-08-27 DIAGNOSIS — N18.30 CKD (CHRONIC KIDNEY DISEASE), STAGE III (HCC): ICD-10-CM

## 2019-08-27 DIAGNOSIS — Z94.0 DECEASED-DONOR KIDNEY TRANSPLANT RECIPIENT: Primary | ICD-10-CM

## 2019-08-27 PROCEDURE — 99215 OFFICE O/P EST HI 40 MIN: CPT | Performed by: INTERNAL MEDICINE

## 2019-08-27 NOTE — PROGRESS NOTES
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 Wife was diagnosed with jaw cancer now has feeding tube. MIL  early this year. Now moved to [place]. State was started on spironolactone and had gynecomastia.  Takes flomax and have urinary urgency / nocturia      HISTORY: 100 mg by mouth 2 (two) times daily. Disp:  Rfl:    predniSONE 10 MG Oral Tab Take 10 mg by mouth daily. Disp:  Rfl:    HUMALOG KWIKPEN 100 UNIT/ML Subcutaneous Solution Pen-injector 2 Units.  Sliding scale three times per day  Disp:  Rfl:    DOCQLACE 100 M Calcium 5 MG Oral Tab 5 mg. Disp:  Rfl:        Allergies:     Ace Inhibitors              Comment:Lip swelling  Hydralazine                   ROS:     Constitutional:  Negative for decreased activity, fever, irritability and lethargy  ENMT:  Negative for ea on spot urine - between 1.1 and 1.2 gm ->1.9 gm.  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

## 2019-08-28 LAB — TACROLIMUS: <2 NG/ML

## 2019-09-12 ENCOUNTER — OFFICE VISIT (OUTPATIENT)
Dept: ENDOCRINOLOGY CLINIC | Facility: CLINIC | Age: 69
End: 2019-09-12
Payer: MEDICARE

## 2019-09-12 VITALS
HEART RATE: 61 BPM | WEIGHT: 218.81 LBS | DIASTOLIC BLOOD PRESSURE: 86 MMHG | SYSTOLIC BLOOD PRESSURE: 159 MMHG | BODY MASS INDEX: 32 KG/M2

## 2019-09-12 DIAGNOSIS — Z79.4 CONTROLLED TYPE 2 DIABETES MELLITUS WITH COMPLICATION, WITH LONG-TERM CURRENT USE OF INSULIN (HCC): Primary | ICD-10-CM

## 2019-09-12 DIAGNOSIS — E11.8 CONTROLLED TYPE 2 DIABETES MELLITUS WITH COMPLICATION, WITH LONG-TERM CURRENT USE OF INSULIN (HCC): Primary | ICD-10-CM

## 2019-09-12 LAB
CARTRIDGE LOT#: ABNORMAL NUMERIC
GLUCOSE BLOOD: 143
HEMOGLOBIN A1C: 6.8 % (ref 4.3–5.6)
TEST STRIP LOT #: NORMAL NUMERIC

## 2019-09-12 PROCEDURE — 82962 GLUCOSE BLOOD TEST: CPT | Performed by: INTERNAL MEDICINE

## 2019-09-12 PROCEDURE — 36416 COLLJ CAPILLARY BLOOD SPEC: CPT | Performed by: INTERNAL MEDICINE

## 2019-09-12 PROCEDURE — 99213 OFFICE O/P EST LOW 20 MIN: CPT | Performed by: INTERNAL MEDICINE

## 2019-09-12 PROCEDURE — 83036 HEMOGLOBIN GLYCOSYLATED A1C: CPT | Performed by: INTERNAL MEDICINE

## 2019-09-12 RX ORDER — ROSUVASTATIN CALCIUM 5 MG/1
5 TABLET, COATED ORAL
COMMUNITY
Start: 2019-07-26

## 2019-09-12 NOTE — PROGRESS NOTES
Name: Ho Poe  Date: 9/12/2019    Referring Physician: No ref. provider found    HISTORY OF PRESENT ILLNESS   Ho Poe is a 77year old male who presents for diabetes mellitus, diagnosed with diabetes s/p kidney transplant in 2004.   He is cu Omeprazole 40 MG Oral Capsule Delayed Release, , Disp: , Rfl:   •  ergocalciferol 20865 units Oral Cap, Take 50,000 Units by mouth once a week., Disp: , Rfl:   •  CloNIDine HCl 0.3 MG/24HR Transdermal Patch Weekly, Place 1 patch onto the skin once a week. , MG Oral Tab, Take 30 mg by mouth daily with breakfast., Disp: , Rfl:   •  sulfamethoxazole-trimethoprim (BACTRIM DS,SEPTRA DS) 800-160 MG Oral Tab, Take 1 tablet by mouth daily.  Monday's and thursdays , Disp: , Rfl:   •  tamsulosin HCl (FLOMAX) 0.4 MG Oral previous peritoneal dialysis    • OTHER Right 10/15/15    Removed part of tubing from peritoneal dialysis, abdominal surgery   • TRANSPLANTATION OF KIDNEY  2004      History reviewed. No pertinent family history.    Social History    Tobacco Use      Smokin diet)  -Continue Starlix  -Normotensive   -Renal transplant followed by Dr. Juan Ventura are followed by [facility] transplant team     RTC 6 months    9/12/2019  Debo Mckeon MD

## 2019-09-19 ENCOUNTER — APPOINTMENT (OUTPATIENT)
Dept: LAB | Facility: HOSPITAL | Age: 69
End: 2019-09-19
Attending: INTERNAL MEDICINE
Payer: MEDICARE

## 2019-09-19 DIAGNOSIS — Z94.0 DECEASED-DONOR KIDNEY TRANSPLANT RECIPIENT: ICD-10-CM

## 2019-09-19 LAB
ALBUMIN SERPL-MCNC: 3.1 G/DL (ref 3.4–5)
ANION GAP SERPL CALC-SCNC: 8 MMOL/L (ref 0–18)
BUN BLD-MCNC: 58 MG/DL (ref 7–18)
BUN/CREAT SERPL: 21.3 (ref 10–20)
CALCIUM BLD-MCNC: 8.3 MG/DL (ref 8.5–10.1)
CHLORIDE SERPL-SCNC: 109 MMOL/L (ref 98–112)
CO2 SERPL-SCNC: 27 MMOL/L (ref 21–32)
CREAT BLD-MCNC: 2.72 MG/DL (ref 0.7–1.3)
GLUCOSE BLD-MCNC: 151 MG/DL (ref 70–99)
OSMOLALITY SERPL CALC.SUM OF ELEC: 317 MOSM/KG (ref 275–295)
PHOSPHATE SERPL-MCNC: 3.8 MG/DL (ref 2.5–4.9)
POTASSIUM SERPL-SCNC: 4.2 MMOL/L (ref 3.5–5.1)
SODIUM SERPL-SCNC: 144 MMOL/L (ref 136–145)

## 2019-09-19 PROCEDURE — 80158 DRUG ASSAY CYCLOSPORINE: CPT

## 2019-09-19 PROCEDURE — 36415 COLL VENOUS BLD VENIPUNCTURE: CPT

## 2019-09-19 PROCEDURE — 80069 RENAL FUNCTION PANEL: CPT

## 2019-09-21 LAB — CYCLOSPORINE A: 72.7 NG/ML

## 2019-09-23 ENCOUNTER — TELEPHONE (OUTPATIENT)
Dept: NEPHROLOGY | Facility: CLINIC | Age: 69
End: 2019-09-23

## 2019-09-23 DIAGNOSIS — Z94.0 DECEASED-DONOR KIDNEY TRANSPLANT RECIPIENT: Primary | ICD-10-CM

## 2019-09-23 NOTE — TELEPHONE ENCOUNTER
Have patient change his cyclosporine dose to 75 mg BID - repeat level in 7-10 days    Level ordered - 12 hrs trough

## 2019-09-24 RX ORDER — CYCLOSPORINE 25 MG/1
CAPSULE, GELATIN COATED ORAL
Qty: 180 CAPSULE | Refills: 3 | Status: SHIPPED | OUTPATIENT
Start: 2019-09-24

## 2019-09-24 NOTE — TELEPHONE ENCOUNTER
Patient contacted. Dr. Domenico Schultz's medication advice relayed to patient. He is aware of dose adjustment and lab test that he will do in 7-10 days as advised. He knows to do trough level. New prescription sent to [address]

## 2019-10-16 ENCOUNTER — LAB ENCOUNTER (OUTPATIENT)
Dept: LAB | Facility: HOSPITAL | Age: 69
End: 2019-10-16
Attending: INTERNAL MEDICINE
Payer: MEDICARE

## 2019-10-16 DIAGNOSIS — Z94.0 DECEASED-DONOR KIDNEY TRANSPLANT RECIPIENT: Primary | ICD-10-CM

## 2019-10-16 PROCEDURE — 80158 DRUG ASSAY CYCLOSPORINE: CPT

## 2019-10-16 PROCEDURE — 36415 COLL VENOUS BLD VENIPUNCTURE: CPT

## 2019-11-06 ENCOUNTER — TELEPHONE (OUTPATIENT)
Dept: NEPHROLOGY | Facility: CLINIC | Age: 69
End: 2019-11-06

## 2019-11-06 RX ORDER — HYDRALAZINE HYDROCHLORIDE 25 MG/1
50 TABLET, FILM COATED ORAL 3 TIMES DAILY
Refills: 3 | COMMUNITY
Start: 2019-10-24 | End: 2019-11-13 | Stop reason: SINTOL

## 2019-11-06 NOTE — TELEPHONE ENCOUNTER
pls call pt with most recent results from September - and possible appt - pt has uncontrolled , elevated blood pressure per PCP

## 2019-11-06 NOTE — TELEPHONE ENCOUNTER
Contacted pt. He went to [facility] ER on 11/2 because BP was 200/100 and he heard something like a drumbeat in his ears. He states they did not change his meds. PCP recommended following up with [facility].  Scheduled appt with [facility] 11/13/19 when she returns to office

## 2019-11-06 NOTE — TELEPHONE ENCOUNTER
I'll print the Trisha Simmons ER papers from [facility] and place them on your desk. Pt stated they didn't make any changes to his meds. I called him again to verify what ER did. He states ER did labs and head CT.  They did not give him any meds or advise him t

## 2019-11-06 NOTE — TELEPHONE ENCOUNTER
Increase hydralazine to 50 mg 3 times daily. Continue to monitor blood pressure readings. Follow-up with Dr. Logan Smith and bring in readings.

## 2019-11-13 ENCOUNTER — OFFICE VISIT (OUTPATIENT)
Dept: NEPHROLOGY | Facility: CLINIC | Age: 69
End: 2019-11-13
Payer: MEDICARE

## 2019-11-13 VITALS
HEART RATE: 65 BPM | SYSTOLIC BLOOD PRESSURE: 155 MMHG | WEIGHT: 228.63 LBS | BODY MASS INDEX: 33.1 KG/M2 | HEIGHT: 69.5 IN | DIASTOLIC BLOOD PRESSURE: 72 MMHG | TEMPERATURE: 98 F

## 2019-11-13 DIAGNOSIS — Z94.0 DECEASED-DONOR KIDNEY TRANSPLANT RECIPIENT: Primary | ICD-10-CM

## 2019-11-13 DIAGNOSIS — N18.4 CKD (CHRONIC KIDNEY DISEASE), STAGE IV (HCC): ICD-10-CM

## 2019-11-13 PROCEDURE — 99214 OFFICE O/P EST MOD 30 MIN: CPT | Performed by: INTERNAL MEDICINE

## 2019-11-13 RX ORDER — NATEGLINIDE 60 MG/1
TABLET, COATED ORAL
Qty: 360 TABLET | Refills: 0 | Status: SHIPPED | OUTPATIENT
Start: 2019-11-13 | End: 2020-02-11

## 2019-11-13 RX ORDER — MINOXIDIL 2.5 MG/1
2.5 TABLET ORAL 2 TIMES DAILY
Qty: 180 TABLET | Refills: 1 | Status: SHIPPED | OUTPATIENT
Start: 2019-11-13

## 2019-11-13 NOTE — PATIENT INSTRUCTIONS
Stop hydralazine  Start on minoxidil 2.5 mg daily and increase to twice a day after 2 weeks if blood pressure >130/80 mmhg  Follow up in 4 weeks  Lab test prior to next visit

## 2019-11-13 NOTE — PROGRESS NOTES
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 Takes flomax and have urinary urgency / nocturia. Home BP uncontrolled. Was started on hydralazine - dose adjusted and patient not tolerating it.  Was in ED for dizziness and CT h MG/24HR Transdermal Patch Weekly Place 1 patch onto the skin once a week. • predniSONE 10 MG Oral Tab Take 10 mg by mouth daily. • HUMALOG KWIKPEN 100 UNIT/ML Subcutaneous Solution Pen-injector 2 Units.  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

## 2019-11-25 ENCOUNTER — TELEPHONE (OUTPATIENT)
Dept: ENDOCRINOLOGY CLINIC | Facility: CLINIC | Age: 69
End: 2019-11-25

## 2019-11-25 RX ORDER — BLOOD SUGAR DIAGNOSTIC
STRIP MISCELLANEOUS
Qty: 400 STRIP | Refills: 0 | Status: SHIPPED | OUTPATIENT
Start: 2019-11-25 | End: 2019-12-19

## 2019-11-25 NOTE — TELEPHONE ENCOUNTER
Patient requesting test strips previously received by a company that went out of business. Patient uses [facility], please call at:[phone],thanks.   *patient out of test strips

## 2019-11-25 NOTE — TELEPHONE ENCOUNTER
LOV 9/12/19 - refilled strips per protocol. Patient states he uses [facility] and checks 4 times per day.

## 2019-12-19 RX ORDER — BLOOD SUGAR DIAGNOSTIC
STRIP MISCELLANEOUS
Qty: 400 STRIP | Refills: 0 | Status: SHIPPED | OUTPATIENT
Start: 2019-12-19 | End: 2020-03-25

## 2019-12-19 NOTE — TELEPHONE ENCOUNTER
Current Outpatient Medications   Medication Sig Dispense Refill   • Glucose Blood (ACCU-CHEK SANDRINE PLUS) In Vitro Strip Check blood sugar 4 times per day.  400 strip 0     Refill

## 2019-12-20 ENCOUNTER — TELEPHONE (OUTPATIENT)
Dept: NEPHROLOGY | Facility: CLINIC | Age: 69
End: 2019-12-20

## 2020-01-21 ENCOUNTER — TELEPHONE (OUTPATIENT)
Dept: ENDOCRINOLOGY CLINIC | Facility: CLINIC | Age: 70
End: 2020-01-21

## 2020-01-21 NOTE — TELEPHONE ENCOUNTER
How many times does pt check his blood sugars and does pt have a sliding scale for his insulin treatment. Reference # [id number].

## 2020-01-21 NOTE — TELEPHONE ENCOUNTER
Contacted Sreedhar back. Per Teresa Landry patient's PCP Dr. Jonnie Bates submitted a request for CGM and needing testing frequency. States he was directed to us by PCP office to obtain additional information. Chart reviewed, testing frequency 4x/day.   Odin arevalo

## 2020-01-27 NOTE — TELEPHONE ENCOUNTER
[employer] returned call. CGM has been denied- does not meet coverage criteria of 3+ injections daily and checking BG 4 x per day. Ref #[id number].  # [phone].

## 2020-02-12 RX ORDER — NATEGLINIDE 60 MG/1
TABLET, COATED ORAL
Qty: 270 TABLET | Refills: 0 | Status: SHIPPED | OUTPATIENT
Start: 2020-02-12

## 2020-03-25 RX ORDER — BLOOD SUGAR DIAGNOSTIC
STRIP MISCELLANEOUS
Qty: 400 STRIP | Refills: 0 | Status: SHIPPED | OUTPATIENT
Start: 2020-03-25

## 2020-03-26 RX ORDER — BLOOD SUGAR DIAGNOSTIC
STRIP MISCELLANEOUS
Qty: 400 STRIP | Refills: 0 | Status: SHIPPED | OUTPATIENT
Start: 2020-03-26

## 2020-04-13 RX ORDER — BLOOD SUGAR DIAGNOSTIC
STRIP MISCELLANEOUS
Qty: 400 STRIP | Refills: 0 | OUTPATIENT
Start: 2020-04-13

## 2020-04-13 NOTE — TELEPHONE ENCOUNTER
LOV: 09/12/19  LR: 03/26/20 -- for 90 days supply    [facility] to inquire about the request.  Strips were shipped out today per Neel enriquez ().   Refused request.

## 2020-06-09 ENCOUNTER — TELEPHONE (OUTPATIENT)
Dept: ENDOCRINOLOGY CLINIC | Facility: CLINIC | Age: 70
End: 2020-06-09

## 2020-06-09 NOTE — TELEPHONE ENCOUNTER
CMN/refill for diabetic testing supply received from [facility]. LOV 09/12/19 and was advised to RTC 6 months. No future appointments. Needs to contact patient for an appointment.

## 2020-06-16 ENCOUNTER — TELEPHONE (OUTPATIENT)
Dept: ENDOCRINOLOGY CLINIC | Facility: CLINIC | Age: 70
End: 2020-06-16

## 2020-06-17 ENCOUNTER — TELEPHONE (OUTPATIENT)
Dept: ENDOCRINOLOGY CLINIC | Facility: CLINIC | Age: 70
End: 2020-06-17

## 2020-06-17 NOTE — TELEPHONE ENCOUNTER
Prescription request received for syringes and pen needles. Form filled out and placed on Dr. Analia Coats desk for signature.

## 2021-01-29 DIAGNOSIS — Z23 NEED FOR VACCINATION: ICD-10-CM

## (undated) NOTE — MR AVS SNAPSHOT
[facility]  [address] [place] [address] [place] [address] [phone]               Thank you for choosing us for your health care visit with Jose Angel Mena MD.  We are glad to serve you and happy to provide you with this summary of your visit. Take 30 mg by mouth daily with breakfast.   Commonly known as:  SENSIPAR           CloNIDine HCl 0.3 MG/24HR Ptwk   Place 1 patch onto the skin once a week.    Commonly known as:  CATAPRES           cycloSPORINE non-modified 25 MG Caps   Take 100 mg by mout Take 500 mg by mouth daily. Commonly known as:  VALTREX                   MyChart     Sign up for [employer], your secure online medical record.   [employer] will allow you to access patient instructions from your recent visit,  view other health information, a

## (undated) NOTE — LETTER
12/20/2019              Skylar Aquino        [address] [place]         Dear Usha Beverly,    [facility] records indicate that the labs ordered for you by Chetan Arias MD  have not been done and you are due for a follow up.   If you have, in

## (undated) NOTE — MR AVS SNAPSHOT
[facility]  [address] [place] [address] [phone]               Thank you for choosing us for your health care visit with Mika Baxter MD.  We are glad to serve you and happy to provide you with this summary of your visit.   Ple current use of insulin ([facility] [E11.9, Z79.4]                 Follow-up Instructions     Return in about 6 months (around 9/9/2017).          Reason for Today's Visit     Diabetes           Medical Issues Discussed Today     Type 2 diabetes mellitus without c 1 spray by Each Nare route daily. Commonly known as:  FLONASE           furosemide 40 MG Tabs   Take 20 mg by mouth 3 (three) times daily.    Commonly known as:  LASIX           HUMALOG KWIKPEN 100 UNIT/ML Sopn   Generic drug:  Insulin Lispro   Sliding sc [employer] will allow you to access patient instructions from your recent visit,  view other health information, and more. To sign up or find more information, go to [email or web address] and click on the Sign Up Now link in the [employer] box.      Enter 2 ½ hours per week – spread out over time Use a siri to keep you motivated   Don’t forget strength training with weights and resistance Set goals and track your progress   You don’t need to join a gym. Home exercises work great.  Put more priority on exe